# Patient Record
Sex: MALE | Race: OTHER | NOT HISPANIC OR LATINO
[De-identification: names, ages, dates, MRNs, and addresses within clinical notes are randomized per-mention and may not be internally consistent; named-entity substitution may affect disease eponyms.]

---

## 2021-06-04 PROBLEM — Z00.00 ENCOUNTER FOR PREVENTIVE HEALTH EXAMINATION: Status: ACTIVE | Noted: 2021-06-04

## 2021-06-07 ENCOUNTER — APPOINTMENT (OUTPATIENT)
Dept: GASTROENTEROLOGY | Facility: CLINIC | Age: 56
End: 2021-06-07
Payer: COMMERCIAL

## 2021-06-07 VITALS
WEIGHT: 170 LBS | HEIGHT: 69 IN | SYSTOLIC BLOOD PRESSURE: 137 MMHG | TEMPERATURE: 97.3 F | DIASTOLIC BLOOD PRESSURE: 84 MMHG | HEART RATE: 66 BPM | BODY MASS INDEX: 25.18 KG/M2 | RESPIRATION RATE: 14 BRPM | OXYGEN SATURATION: 98 %

## 2021-06-07 DIAGNOSIS — Z12.11 ENCOUNTER FOR SCREENING FOR MALIGNANT NEOPLASM OF COLON: ICD-10-CM

## 2021-06-07 PROCEDURE — 99203 OFFICE O/P NEW LOW 30 MIN: CPT

## 2021-06-07 PROCEDURE — 82272 OCCULT BLD FECES 1-3 TESTS: CPT

## 2021-06-07 NOTE — REASON FOR VISIT
[Initial Evaluation] : an initial evaluation [FreeTextEntry1] : The patient is here for screening colonoscopy

## 2021-06-07 NOTE — PHYSICAL EXAM
[General Appearance - Alert] : alert [General Appearance - In No Acute Distress] : in no acute distress [General Appearance - Well Developed] : well developed [General Appearance - Well Nourished] : well nourished [Sclera] : the sclera and conjunctiva were normal [Outer Ear] : the ears and nose were normal in appearance [Neck Appearance] : the appearance of the neck was normal [Neck Cervical Mass (___cm)] : no neck mass was observed [] : no respiratory distress [Respiration, Rhythm And Depth] : normal respiratory rhythm and effort [Exaggerated Use Of Accessory Muscles For Inspiration] : no accessory muscle use [Auscultation Breath Sounds / Voice Sounds] : lungs were clear to auscultation bilaterally [Apical Impulse] : the apical impulse was normal [Heart Rate And Rhythm] : heart rate was normal and rhythm regular [Bowel Sounds] : normal bowel sounds [Abdomen Soft] : soft [Abdomen Tenderness] : non-tender [Normal Sphincter Tone] : normal sphincter tone [No Rectal Mass] : no rectal mass [Internal Hemorrhoid] : no internal hemorrhoids [External Hemorrhoid] : no external hemorrhoids [Occult Blood Positive] : stool was negative for occult blood [Femoral Lymph Nodes Enlarged Bilaterally] : femoral [Skin Color & Pigmentation] : normal skin color and pigmentation [Oriented To Time, Place, And Person] : oriented to person, place, and time [Impaired Insight] : insight and judgment were intact [Affect] : the affect was normal [Mood] : the mood was normal

## 2021-06-07 NOTE — ASSESSMENT
[FreeTextEntry1] : A/P\par Patient is here for screening colonoscopy\par  I discussed the risks and benefits of colonoscopy and patient was given opportunity to ask questions. Colonoscopy to r/o colon cancer, polyps, AVM's. Patient is medically optimized for the procedure\par miralax prep \par NP from primary MD

## 2021-06-07 NOTE — HISTORY OF PRESENT ILLNESS
[de-identified] : Patient is here with history of diabetes high cholesterol..  He is here for screening colonoscopy.  He has no GI symptoms.  He has no constipation diarrhea black stools bloody stools abdominal pain change in bowel habits or weight loss.  He has no heartburn or acid reflux or dysphagia.

## 2021-08-16 ENCOUNTER — APPOINTMENT (OUTPATIENT)
Dept: GASTROENTEROLOGY | Facility: GI CENTER | Age: 56
End: 2021-08-16

## 2023-01-06 ENCOUNTER — APPOINTMENT (OUTPATIENT)
Dept: GASTROENTEROLOGY | Facility: CLINIC | Age: 58
End: 2023-01-06
Payer: COMMERCIAL

## 2023-01-06 VITALS
WEIGHT: 175 LBS | OXYGEN SATURATION: 98 % | RESPIRATION RATE: 14 BRPM | TEMPERATURE: 98 F | SYSTOLIC BLOOD PRESSURE: 170 MMHG | BODY MASS INDEX: 25.92 KG/M2 | DIASTOLIC BLOOD PRESSURE: 100 MMHG | HEIGHT: 69 IN | HEART RATE: 79 BPM

## 2023-01-06 PROCEDURE — 99213 OFFICE O/P EST LOW 20 MIN: CPT

## 2023-01-06 NOTE — HISTORY OF PRESENT ILLNESS
[FreeTextEntry1] : Patient here for screening colonoscopy no constipation diarrhea black stools bloody stools no family history of colon cancer or colon polyps.  Apparently patient was scheduled for colonoscopy in 2021 but prepped on the wrong day and had to be rescheduled.  He is only now coming to reschedule

## 2023-01-06 NOTE — ASSESSMENT
[FreeTextEntry1] : A/P\par screening colon\par  I discussed the risks and benefits of colonoscopy and patient was given opportunity to ask questions. Colonoscopy to r/o colon cancer, polyps, AVM's. Patient is medically optimized for the procedure\par miralax prep

## 2023-03-15 ENCOUNTER — APPOINTMENT (OUTPATIENT)
Dept: GASTROENTEROLOGY | Facility: GI CENTER | Age: 58
End: 2023-03-15

## 2023-05-07 ENCOUNTER — TRANSCRIPTION ENCOUNTER (OUTPATIENT)
Age: 58
End: 2023-05-07

## 2023-05-08 ENCOUNTER — APPOINTMENT (OUTPATIENT)
Dept: GASTROENTEROLOGY | Facility: GI CENTER | Age: 58
End: 2023-05-08
Payer: COMMERCIAL

## 2023-05-08 ENCOUNTER — OUTPATIENT (OUTPATIENT)
Dept: OUTPATIENT SERVICES | Facility: HOSPITAL | Age: 58
LOS: 1 days | End: 2023-05-08
Payer: COMMERCIAL

## 2023-05-08 DIAGNOSIS — Z12.11 ENCOUNTER FOR SCREENING FOR MALIGNANT NEOPLASM OF COLON: ICD-10-CM

## 2023-05-08 LAB — GLUCOSE BLDC GLUCOMTR-MCNC: 169 MG/DL — HIGH (ref 70–99)

## 2023-05-08 PROCEDURE — G0121: CPT

## 2023-05-08 PROCEDURE — 45378 DIAGNOSTIC COLONOSCOPY: CPT | Mod: 33

## 2023-05-08 PROCEDURE — 82962 GLUCOSE BLOOD TEST: CPT

## 2023-05-08 NOTE — PHYSICAL EXAM
[Alert] : alert [Normal Voice/Communication] : normal voice/communication [Healthy Appearing] : healthy appearing [No Respiratory Distress] : no respiratory distress [No Acc Muscle Use] : no accessory muscle use [Respiration, Rhythm And Depth] : normal respiratory rhythm and effort [Auscultation Breath Sounds / Voice Sounds] : lungs were clear to auscultation bilaterally [Heart Rate And Rhythm] : heart rate was normal and rhythm regular [Normal S1, S2] : normal S1 and S2 [Bowel Sounds] : normal bowel sounds [Abdomen Tenderness] : non-tender [No Masses] : no abdominal mass palpated [Abdomen Soft] : soft [Oriented To Time, Place, And Person] : oriented to person, place, and time

## 2024-10-11 ENCOUNTER — OFFICE (OUTPATIENT)
Dept: URBAN - METROPOLITAN AREA CLINIC 63 | Facility: CLINIC | Age: 59
Setting detail: OPHTHALMOLOGY
End: 2024-10-11
Payer: COMMERCIAL

## 2024-10-11 DIAGNOSIS — H52.4: ICD-10-CM

## 2024-10-11 DIAGNOSIS — H40.1132: ICD-10-CM

## 2024-10-11 DIAGNOSIS — H02.011: ICD-10-CM

## 2024-10-11 DIAGNOSIS — H35.033: ICD-10-CM

## 2024-10-11 DIAGNOSIS — H02.014: ICD-10-CM

## 2024-10-11 PROCEDURE — 92133 CPTRZD OPH DX IMG PST SGM ON: CPT | Performed by: STUDENT IN AN ORGANIZED HEALTH CARE EDUCATION/TRAINING PROGRAM

## 2024-10-11 PROCEDURE — 92014 COMPRE OPH EXAM EST PT 1/>: CPT | Performed by: STUDENT IN AN ORGANIZED HEALTH CARE EDUCATION/TRAINING PROGRAM

## 2024-10-11 PROCEDURE — 92015 DETERMINE REFRACTIVE STATE: CPT | Performed by: STUDENT IN AN ORGANIZED HEALTH CARE EDUCATION/TRAINING PROGRAM

## 2024-10-11 ASSESSMENT — TONOMETRY
OS_IOP_MMHG: 18
OD_IOP_MMHG: 20

## 2024-10-11 ASSESSMENT — CONFRONTATIONAL VISUAL FIELD TEST (CVF)
OS_FINDINGS: FULL
OD_FINDINGS: FULL

## 2024-10-11 ASSESSMENT — PACHYMETRY
OD_CT_UM: 535
OD_CT_CORRECTION: 1
OS_CT_CORRECTION: 1
OS_CT_UM: 537

## 2024-10-11 ASSESSMENT — REFRACTION_MANIFEST
OD_CYLINDER: -0.25
OS_ADD: +1.50
OD_AXIS: 060
OD_SPHERE: +1.25
OS_SPHERE: +1.50
OU_VA: 20/20
OS_VA1: 20/20
OS_VA1: 20/20
OD_VA1: 20/20
OD_ADD: +1.50
OS_CYLINDER: -0.25
OD_CYLINDER: -0.50
OD_VA1: 20/20
OS_ADD: +2.50
OS_AXIS: 060
OU_VA: 20/20
OD_VA2: 20/20
OS_VA2: 20/20
OD_AXIS: 060
OS_SPHERE: +1.50
OD_ADD: +2.50
OS_CYLINDER: -0.50
OS_AXIS: 070
OD_SPHERE: +1.75

## 2024-10-11 ASSESSMENT — SUPERFICIAL PUNCTATE KERATITIS (SPK)
OD_SPK: 1+
OS_SPK: 1+

## 2024-10-11 ASSESSMENT — LID EXAM ASSESSMENTS
OD_TRICHIASIS: RUL 1+
OD_COMMENTS: CRUSTING
OS_COMMENTS: CRUSTING
OS_BLEPHARITIS: T
OS_TRICHIASIS: LUL 1+
OD_BLEPHARITIS: T

## 2024-10-11 ASSESSMENT — REFRACTION_CURRENTRX
OS_OVR_VA: 20/
OD_SPHERE: PLANO
OS_SPHERE: +2.50
OD_AXIS: 180
OS_CYLINDER: 0.00
OD_OVR_VA: 20/
OS_AXIS: 180
OD_CYLINDER: 0.00

## 2024-10-11 ASSESSMENT — VISUAL ACUITY
OS_BCVA: 20/40
OD_BCVA: 20/40

## 2024-10-11 ASSESSMENT — KERATOMETRY
OD_K2POWER_DIOPTERS: 43.00
OD_K1POWER_DIOPTERS: 42.75
OD_AXISANGLE_DEGREES: 076
OS_K2POWER_DIOPTERS: 42.75
OS_K1POWER_DIOPTERS: 42.25
OS_AXISANGLE_DEGREES: 096

## 2024-10-11 ASSESSMENT — REFRACTION_AUTOREFRACTION
OS_SPHERE: +1.50
OD_AXIS: 059
OD_SPHERE: +1.25
OS_CYLINDER: -0.25
OD_CYLINDER: -0.25
OS_AXIS: 070

## 2025-01-24 ENCOUNTER — OFFICE (OUTPATIENT)
Dept: URBAN - METROPOLITAN AREA CLINIC 63 | Facility: CLINIC | Age: 60
Setting detail: OPHTHALMOLOGY
End: 2025-01-24
Payer: COMMERCIAL

## 2025-01-24 VITALS — HEIGHT: 60 IN

## 2025-01-24 DIAGNOSIS — H25.813: ICD-10-CM

## 2025-01-24 DIAGNOSIS — H01.001: ICD-10-CM

## 2025-01-24 DIAGNOSIS — H02.011: ICD-10-CM

## 2025-01-24 DIAGNOSIS — H16.223: ICD-10-CM

## 2025-01-24 DIAGNOSIS — H02.014: ICD-10-CM

## 2025-01-24 DIAGNOSIS — H35.033: ICD-10-CM

## 2025-01-24 DIAGNOSIS — H01.004: ICD-10-CM

## 2025-01-24 DIAGNOSIS — H40.1132: ICD-10-CM

## 2025-01-24 DIAGNOSIS — H11.043: ICD-10-CM

## 2025-01-24 DIAGNOSIS — H52.4: ICD-10-CM

## 2025-01-24 DIAGNOSIS — H11.153: ICD-10-CM

## 2025-01-24 PROCEDURE — 92083 EXTENDED VISUAL FIELD XM: CPT | Performed by: INTERNAL MEDICINE

## 2025-01-24 PROCEDURE — 99213 OFFICE O/P EST LOW 20 MIN: CPT | Performed by: INTERNAL MEDICINE

## 2025-01-24 PROCEDURE — 92015 DETERMINE REFRACTIVE STATE: CPT | Performed by: INTERNAL MEDICINE

## 2025-01-24 ASSESSMENT — VISUAL ACUITY
OD_BCVA: 20/40-1
OS_BCVA: 20/40-1

## 2025-01-24 ASSESSMENT — KERATOMETRY
OD_K1POWER_DIOPTERS: 42.50
OD_AXISANGLE_DEGREES: 032
OS_K1POWER_DIOPTERS: 42.00
OD_K2POWER_DIOPTERS: 42.75
OS_AXISANGLE_DEGREES: 118
OS_K2POWER_DIOPTERS: 42.50

## 2025-01-24 ASSESSMENT — REFRACTION_CURRENTRX
OD_OVR_VA: 20/
OS_VPRISM_DIRECTION: SV
OD_SPHERE: +2.50
OD_VPRISM_DIRECTION: SV
OS_OVR_VA: 20/
OS_SPHERE: +2.50

## 2025-01-24 ASSESSMENT — CORNEAL PTERYGIUM
OD_PTERYGIUM: NASAL 1MM
OS_PTERYGIUM: NASAL 1MM

## 2025-01-24 ASSESSMENT — TONOMETRY
OS_IOP_MMHG: 18
OD_IOP_MMHG: 18
OD_IOP_MMHG: 21
OS_IOP_MMHG: 20

## 2025-01-24 ASSESSMENT — REFRACTION_MANIFEST
OU_VA: 20/20
OS_VA1: 20/20
OS_AXIS: 070
OS_VA2: 20/20
OS_ADD: +2.25
OS_SPHERE: +1.75
OD_VA2: 20/20
OS_CYLINDER: -0.50
OD_ADD: +2.25
OD_VA1: 20/20
OD_AXIS: 070
OD_CYLINDER: -0.50
OD_SPHERE: +1.50

## 2025-01-24 ASSESSMENT — PACHYMETRY
OS_CT_CORRECTION: 1
OS_CT_UM: 537
OD_CT_UM: 535
OD_CT_CORRECTION: 1

## 2025-01-24 ASSESSMENT — REFRACTION_AUTOREFRACTION
OD_SPHERE: +1.50
OS_AXIS: 070
OD_AXIS: 067
OD_CYLINDER: -0.75
OS_SPHERE: +2.25
OS_CYLINDER: -0.75

## 2025-01-24 ASSESSMENT — SUPERFICIAL PUNCTATE KERATITIS (SPK)
OD_SPK: 1+
OS_SPK: 1+

## 2025-01-24 ASSESSMENT — LID EXAM ASSESSMENTS
OD_TRICHIASIS: RUL 1+
OS_COMMENTS: CRUSTING
OS_TRICHIASIS: LUL 2+
OS_BLEPHARITIS: T
OD_BLEPHARITIS: T
OD_COMMENTS: CRUSTING

## 2025-01-24 ASSESSMENT — CONFRONTATIONAL VISUAL FIELD TEST (CVF)
OD_FINDINGS: FULL
OS_FINDINGS: FULL

## 2025-05-27 ENCOUNTER — OFFICE (OUTPATIENT)
Dept: URBAN - METROPOLITAN AREA CLINIC 63 | Facility: CLINIC | Age: 60
Setting detail: OPHTHALMOLOGY
End: 2025-05-27
Payer: COMMERCIAL

## 2025-05-27 DIAGNOSIS — H01.001: ICD-10-CM

## 2025-05-27 DIAGNOSIS — H52.4: ICD-10-CM

## 2025-05-27 DIAGNOSIS — H25.813: ICD-10-CM

## 2025-05-27 DIAGNOSIS — H40.1132: ICD-10-CM

## 2025-05-27 DIAGNOSIS — H11.153: ICD-10-CM

## 2025-05-27 PROCEDURE — 92133 CPTRZD OPH DX IMG PST SGM ON: CPT | Performed by: INTERNAL MEDICINE

## 2025-05-27 PROCEDURE — 92015 DETERMINE REFRACTIVE STATE: CPT | Performed by: INTERNAL MEDICINE

## 2025-05-27 PROCEDURE — 99213 OFFICE O/P EST LOW 20 MIN: CPT | Performed by: INTERNAL MEDICINE

## 2025-05-27 ASSESSMENT — PACHYMETRY
OD_CT_UM: 535
OD_CT_CORRECTION: 1
OS_CT_UM: 537
OS_CT_CORRECTION: 1

## 2025-05-27 ASSESSMENT — KERATOMETRY
METHOD_AUTO_MANUAL: AUTO
OD_K1POWER_DIOPTERS: 42.75
OD_K2POWER_DIOPTERS: 42.75
OS_AXISANGLE_DEGREES: 122
OS_K2POWER_DIOPTERS: 42.50
OD_AXISANGLE_DEGREES: 090
OS_K1POWER_DIOPTERS: 42.00

## 2025-05-27 ASSESSMENT — REFRACTION_CURRENTRX
OS_SPHERE: +2.50
OD_VPRISM_DIRECTION: SV
OD_OVR_VA: 20/
OD_SPHERE: +2.50
OS_VPRISM_DIRECTION: SV
OS_OVR_VA: 20/

## 2025-05-27 ASSESSMENT — REFRACTION_MANIFEST
OS_AXIS: 070
OD_CYLINDER: -0.50
OD_AXIS: 070
OD_ADD: +2.25
OS_VA1: 20/20
OD_VA2: 20/20
OS_CYLINDER: -0.50
OD_SPHERE: +1.50
OD_VA1: 20/20
OS_ADD: +2.25
OS_VA2: 20/20
OU_VA: 20/20
OS_SPHERE: +1.75

## 2025-05-27 ASSESSMENT — VISUAL ACUITY
OS_BCVA: 20/25
OD_BCVA: 20/30

## 2025-05-27 ASSESSMENT — LID EXAM ASSESSMENTS
OS_BLEPHARITIS: T
OD_COMMENTS: CRUSTING
OD_BLEPHARITIS: T
OS_COMMENTS: CRUSTING
OD_TRICHIASIS: ABSENT
OS_TRICHIASIS: ABSENT

## 2025-05-27 ASSESSMENT — REFRACTION_AUTOREFRACTION
OS_CYLINDER: -0.75
OD_AXIS: 058
OD_CYLINDER: -0.75
OS_AXIS: 072
OD_SPHERE: +1.50
OS_SPHERE: +2.00

## 2025-05-27 ASSESSMENT — CORNEAL PTERYGIUM
OS_PTERYGIUM: NASAL 1MM
OD_PTERYGIUM: NASAL 1MM

## 2025-05-27 ASSESSMENT — CONFRONTATIONAL VISUAL FIELD TEST (CVF)
OD_FINDINGS: FULL
OS_FINDINGS: FULL

## 2025-05-27 ASSESSMENT — TONOMETRY: OS_IOP_MMHG: 21

## 2025-05-27 ASSESSMENT — SUPERFICIAL PUNCTATE KERATITIS (SPK)
OD_SPK: 1+
OS_SPK: 1+

## 2025-07-08 ENCOUNTER — OFFICE (OUTPATIENT)
Dept: URBAN - METROPOLITAN AREA CLINIC 63 | Facility: CLINIC | Age: 60
Setting detail: OPHTHALMOLOGY
End: 2025-07-08
Payer: COMMERCIAL

## 2025-07-08 DIAGNOSIS — H40.1132: ICD-10-CM

## 2025-07-08 PROCEDURE — 99212 OFFICE O/P EST SF 10 MIN: CPT | Performed by: INTERNAL MEDICINE

## 2025-07-08 ASSESSMENT — CONFRONTATIONAL VISUAL FIELD TEST (CVF)
OD_FINDINGS: FULL
OS_FINDINGS: FULL

## 2025-07-08 ASSESSMENT — REFRACTION_CURRENTRX
OS_OVR_VA: 20/
OS_VPRISM_DIRECTION: SV
OD_VPRISM_DIRECTION: SV
OD_OVR_VA: 20/
OS_SPHERE: +2.50
OD_SPHERE: +2.50

## 2025-07-08 ASSESSMENT — KERATOMETRY
METHOD_AUTO_MANUAL: AUTO
OS_AXISANGLE_DEGREES: 122
OD_K2POWER_DIOPTERS: 42.75
OD_AXISANGLE_DEGREES: 090
OD_K1POWER_DIOPTERS: 42.75
OS_K1POWER_DIOPTERS: 42.00
OS_K2POWER_DIOPTERS: 42.50

## 2025-07-08 ASSESSMENT — REFRACTION_AUTOREFRACTION
OS_CYLINDER: -0.75
OS_SPHERE: +2.00
OD_SPHERE: +1.50
OS_AXIS: 072
OD_CYLINDER: -0.75
OD_AXIS: 058

## 2025-07-08 ASSESSMENT — VISUAL ACUITY
OD_BCVA: 20/30
OS_BCVA: 20/25-1

## 2025-07-08 ASSESSMENT — CORNEAL PTERYGIUM
OD_PTERYGIUM: NASAL 1MM
OS_PTERYGIUM: NASAL 1MM

## 2025-07-08 ASSESSMENT — PACHYMETRY
OS_CT_UM: 537
OD_CT_CORRECTION: 1
OS_CT_CORRECTION: 1
OD_CT_UM: 535

## 2025-07-08 ASSESSMENT — TONOMETRY
OD_IOP_MMHG: 18
OS_IOP_MMHG: 20
OD_IOP_MMHG: 13
OS_IOP_MMHG: 14

## 2025-07-08 ASSESSMENT — REFRACTION_MANIFEST
OS_VA1: 20/20
OS_CYLINDER: -0.50
OD_VA2: 20/20
OD_VA1: 20/20
OS_VA2: 20/20
OS_AXIS: 070
OD_ADD: +2.25
OU_VA: 20/20
OD_AXIS: 070
OS_ADD: +2.25
OD_CYLINDER: -0.50
OD_SPHERE: +1.50
OS_SPHERE: +1.75

## 2025-07-08 ASSESSMENT — SUPERFICIAL PUNCTATE KERATITIS (SPK)
OD_SPK: 1+
OS_SPK: 1+

## 2025-07-08 ASSESSMENT — LID EXAM ASSESSMENTS
OD_TRICHIASIS: ABSENT
OS_TRICHIASIS: ABSENT
OD_BLEPHARITIS: T
OS_COMMENTS: CRUSTING
OS_BLEPHARITIS: T
OD_COMMENTS: CRUSTING

## (undated) DEVICE — STERIS DEFENDO 3-PIECE KIT (AIR/WATER, SUCTION & BIOPSY VALVES)